# Patient Record
Sex: FEMALE
[De-identification: names, ages, dates, MRNs, and addresses within clinical notes are randomized per-mention and may not be internally consistent; named-entity substitution may affect disease eponyms.]

---

## 2018-04-17 ENCOUNTER — HOSPITAL ENCOUNTER (EMERGENCY)
Dept: HOSPITAL 92 - ERS | Age: 63
Discharge: HOME | End: 2018-04-17
Payer: MEDICAID

## 2018-04-17 DIAGNOSIS — Z79.899: ICD-10-CM

## 2018-04-17 DIAGNOSIS — J45.909: ICD-10-CM

## 2018-04-17 DIAGNOSIS — F32.9: ICD-10-CM

## 2018-04-17 DIAGNOSIS — F41.9: ICD-10-CM

## 2018-04-17 DIAGNOSIS — I10: Primary | ICD-10-CM

## 2018-04-17 DIAGNOSIS — Z79.82: ICD-10-CM

## 2018-04-17 PROCEDURE — 93005 ELECTROCARDIOGRAM TRACING: CPT

## 2018-06-23 NOTE — EKG
Test Reason : 

Blood Pressure : ***/*** mmHG

Vent. Rate : 066 BPM     Atrial Rate : 066 BPM

   P-R Int : 148 ms          QRS Dur : 076 ms

    QT Int : 434 ms       P-R-T Axes : 041 041 048 degrees

   QTc Int : 454 ms

 

Normal sinus rhythm

Nonspecific T wave abnormality

Abnormal ECG

 

Confirmed by OSMAN WILDER M.D. ([id number]),  SOLEDAD VIZCARRA ([id number]) on 6/23/2018 4:22:30 PM

 

Referred By:             Confirmed By:OSMAN WILDER M.D.